# Patient Record
(demographics unavailable — no encounter records)

---

## 2025-05-20 NOTE — REASON FOR VISIT
[Behavioral Health Urgent Care Assessment] : a behavioral health urgent care assessment [Patient] : patient [Self] : alone [School] : school [Father] : with father [TextBox_17] : connection to therapy

## 2025-05-20 NOTE — RISK ASSESSMENT
[Clinical Interview] : Clinical Interview [Collateral Sources] : Collateral Sources [No] : No [No known suicide factors] : No known suicide factors [Non-compliant or not receiving treatment] : non-compliant or not receiving treatment [Triggering events leading to humiliation, shame, and/or despair] : triggering events leading to humiliation, shame, and/or despair (e.g. loss of relationship, financial or health status) (real or anticipated) [Identifies reasons for living] : identifies reasons for living [Supportive social network of family or friends] : supportive social network of family or friends [Engaged in work or school] : engaged in work or school [None in the patient's lifetime] : None in the patient's lifetime [None Known] : none known [No known risk factors] : No known risk factors [Yes] : yes [de-identified] : no access to guns

## 2025-05-20 NOTE — PLAN
[Provision of National Suicide Prevention Lifeline 6-426-670-TALK (6009)] : Provision of national suicide prevention lifeline 9-191-988-talk (6149) [Patient] : patient [Family] : family [None on Record] : none on record [TextBox_9] : therapy and new neurologist  [TextBox_11] : continue stimulant as prescribed by current neurologist for now  [TextBox_13] :  no safety concerns at this time. no guns at home. family should call 911 or go to nearest ER or any acute safety concerns. [TextBox_26] : referred by school, signed consent, emailed contact

## 2025-05-20 NOTE — ADDENDUM
[FreeTextEntry1] : Patient was seen and examined by me, Dr. Matilda Alejo. I reviewed and agreed with the findings and plan as documented in the [facility] note, unless noted below.

## 2025-05-20 NOTE — HISTORY OF PRESENT ILLNESS
[Not Applicable] : Not applicable [FreeTextEntry1] : Patient is a 13 y/o male, domiciled with parents, 5 y/o brother and 10 y/o sister, currently enrolled at [employer], 7th grade general education with IEP, PPH ADHD, currently in outpatient treatment with neurologist prescribed methylphenidate 18mg, no prior psychiatric hospitalization, no self-injury or suicide attempts, no aggression/violence, no substance use, no legal issues, no CPS involvement, no trauma/abuse, no PMH, presenting today with father who was referred by school for connection to therapy for sadness and anxiety in response to stressors.  [facility] met with the patient who presented as polite and cooperative. He says he is not sure why here is here but was willing to engage in conversation. Patient shares he had "random mental breakdowns" ~jan/feb, feels improvements have been noted in recent weeks but continues to endorse some mood fluctuations in response to stressors. He was able to identify kids being "annoying" to him about his ethnicity as a trigger. Patient reports this causes low moods as it "hurts his feelings". Patient says he is glad his medication "limits his emotions" because he feels it helps him not care as much. Writer provided psychoeducation about the importance of allowing himself to feel his feelings. Reports worrying bullying will happen again when he goes to school but denies school avoidance. Patient notes another trigger for sadness is if he says or does something he regrets. He denies persistent sadness or changes to sleep, appetite, interest, energy or motivation at this time. Denies hx NSSIB or SI. Adds he believes he is going through puberty and thinks this is also impacting mood.  He reports doing well in school, feels he has good time management and organization skills- finds the medication helpful in helping him focus and helps him not to be forgetful. Patient notices when he does not take his medication, he is fidgety, forgetful, bored easily and impulsive. Notes medication also impacts appetite but denies weight concerns. Patient reports worries about the future, "how can I stay with my parents forever", "if I make a mistake I worry something bad will happen", he worries about how he will socialize over the summer when school is over, he worries what others think of him. Denies hx PA. denies/does not present with symptoms of omari or psychosis. He presents as help seeking and motivated for treatment.   [facility] obtained collateral information from father. He describes the patient as thoughtful and kind. Dad feels the patient has presented as sad and emotional for the last 2 months. Reports patient was tearful at times but told parents he did not want to talk about it. Dad feels patient has always been shy but feels it has been more significant since middle school. Reports patient does not have a lot of friends and is quiet in school, often sits by himself. Dad feels he has some anxiety with being in the spotlight. Reports bullying started this school year. Denies persistent sad moods, or changes to sleep, appetite, interest, energy or motivation. Denies hx NSSIB or SI. Denies excessive worries. Denies behavioral issues. Denies acute safety concerns- is looking forward to patient being connected to therapy.  [FreeTextEntry2] : Patient has not had any past psychiatric visits, hospitalizations, medication trials or visits with a therapist. No hx suicidality, suicide attempts or self injurious behaviors.     [FreeTextEntry3] : n/a- has been on methylphenidate since 1st grade

## 2025-05-20 NOTE — PHYSICAL EXAM
[Cooperative] : cooperative [Euthymic] : euthymic [Anxious] : anxious [Full] : full [Clear] : clear [Linear/Goal Directed] : linear/goal directed [Average] : average [WNL] : within normal limits [Positive interaction] : positive interaction [Unremarkable/age appropriate] : unremarkable/age appropriate [Normal] : normal [None] : none

## 2025-05-20 NOTE — PLAN
[Provision of National Suicide Prevention Lifeline 6-530-205-TALK (6423)] : Provision of national suicide prevention lifeline 8-780-575-talk (6849) [Patient] : patient [Family] : family [None on Record] : none on record [TextBox_9] : therapy and new neurologist  [TextBox_11] : continue stimulant as prescribed by current neurologist for now  [TextBox_13] :  no safety concerns at this time. no guns at home. family should call 911 or go to nearest ER or any acute safety concerns. [TextBox_26] : referred by school, signed consent, emailed contact

## 2025-05-20 NOTE — RISK ASSESSMENT
[Clinical Interview] : Clinical Interview [Collateral Sources] : Collateral Sources [No] : No [No known suicide factors] : No known suicide factors [Non-compliant or not receiving treatment] : non-compliant or not receiving treatment [Triggering events leading to humiliation, shame, and/or despair] : triggering events leading to humiliation, shame, and/or despair (e.g. loss of relationship, financial or health status) (real or anticipated) [Identifies reasons for living] : identifies reasons for living [Supportive social network of family or friends] : supportive social network of family or friends [Engaged in work or school] : engaged in work or school [None in the patient's lifetime] : None in the patient's lifetime [None Known] : none known [No known risk factors] : No known risk factors [Yes] : yes [de-identified] : no access to guns

## 2025-05-20 NOTE — DISCUSSION/SUMMARY
[Low acute suicide risk] : Low acute suicide risk [No] : No [Not clinically indicated] : Safety Plan completed/updated (for individuals at risk): Not clinically indicated [FreeTextEntry1] : At present, patient has a low risk of harm to self.  Although patient has risk factors including male gender, ADHD, patient has significant protective factors including strong family/social support, domiciled, age, lack of prior self-harm, no suicide attempts, no substance use, no omari, no psychosis, no CAH, no psychiatric hospitalization, current willingness to engage in treatment, future orientation with long & short term goals for the future, hopeful, help-seeking, engaged in school & activities, current denial of any SIIP or urges to self-harm, no reported hx of abuse/trauma, no aggression/violence, no access to guns/family is able to means restrict, no legal history.

## 2025-05-20 NOTE — HISTORY OF PRESENT ILLNESS
[Not Applicable] : Not applicable [FreeTextEntry1] : Patient is a 11 y/o male, domiciled with parents, 7 y/o brother and 8 y/o sister, currently enrolled at [employer], 7th grade general education with IEP, PPH ADHD, currently in outpatient treatment with neurologist prescribed methylphenidate 18mg, no prior psychiatric hospitalization, no self-injury or suicide attempts, no aggression/violence, no substance use, no legal issues, no CPS involvement, no trauma/abuse, no PMH, presenting today with father who was referred by school for connection to therapy for sadness and anxiety in response to stressors.  [facility] met with the patient who presented as polite and cooperative. He says he is not sure why here is here but was willing to engage in conversation. Patient shares he had "random mental breakdowns" ~jan/feb, feels improvements have been noted in recent weeks but continues to endorse some mood fluctuations in response to stressors. He was able to identify kids being "annoying" to him about his ethnicity as a trigger. Patient reports this causes low moods as it "hurts his feelings". Patient says he is glad his medication "limits his emotions" because he feels it helps him not care as much. Writer provided psychoeducation about the importance of allowing himself to feel his feelings. Reports worrying bullying will happen again when he goes to school but denies school avoidance. Patient notes another trigger for sadness is if he says or does something he regrets. He denies persistent sadness or changes to sleep, appetite, interest, energy or motivation at this time. Denies hx NSSIB or SI. Adds he believes he is going through puberty and thinks this is also impacting mood.  He reports doing well in school, feels he has good time management and organization skills- finds the medication helpful in helping him focus and helps him not to be forgetful. Patient notices when he does not take his medication, he is fidgety, forgetful, bored easily and impulsive. Notes medication also impacts appetite but denies weight concerns. Patient reports worries about the future, "how can I stay with my parents forever", "if I make a mistake I worry something bad will happen", he worries about how he will socialize over the summer when school is over, he worries what others think of him. Denies hx PA. denies/does not present with symptoms of omari or psychosis. He presents as help seeking and motivated for treatment.   [facility] obtained collateral information from father. He describes the patient as thoughtful and kind. Dad feels the patient has presented as sad and emotional for the last 2 months. Reports patient was tearful at times but told parents he did not want to talk about it. Dad feels patient has always been shy but feels it has been more significant since middle school. Reports patient does not have a lot of friends and is quiet in school, often sits by himself. Dad feels he has some anxiety with being in the spotlight. Reports bullying started this school year. Denies persistent sad moods, or changes to sleep, appetite, interest, energy or motivation. Denies hx NSSIB or SI. Denies excessive worries. Denies behavioral issues. Denies acute safety concerns- is looking forward to patient being connected to therapy.  [FreeTextEntry2] : Patient has not had any past psychiatric visits, hospitalizations, medication trials or visits with a therapist. No hx suicidality, suicide attempts or self injurious behaviors.     [FreeTextEntry3] : n/a- has been on methylphenidate since 1st grade

## 2025-05-20 NOTE — PLAN
[Provision of National Suicide Prevention Lifeline 6-809-487-TALK (4790)] : Provision of national suicide prevention lifeline 4-421-981-talk (2606) [Patient] : patient [Family] : family [None on Record] : none on record [TextBox_9] : therapy and new neurologist  [TextBox_11] : continue stimulant as prescribed by current neurologist for now  [TextBox_13] :  no safety concerns at this time. no guns at home. family should call 911 or go to nearest ER or any acute safety concerns. [TextBox_26] : referred by school, signed consent, emailed contact

## 2025-05-20 NOTE — HISTORY OF PRESENT ILLNESS
[Not Applicable] : Not applicable [FreeTextEntry1] : Patient is a 11 y/o male, domiciled with parents, 5 y/o brother and 8 y/o sister, currently enrolled at [employer], 7th grade general education with IEP, PPH ADHD, currently in outpatient treatment with neurologist prescribed methylphenidate 18mg, no prior psychiatric hospitalization, no self-injury or suicide attempts, no aggression/violence, no substance use, no legal issues, no CPS involvement, no trauma/abuse, no PMH, presenting today with father who was referred by school for connection to therapy for sadness and anxiety in response to stressors.  [facility] met with the patient who presented as polite and cooperative. He says he is not sure why here is here but was willing to engage in conversation. Patient shares he had "random mental breakdowns" ~jan/feb, feels improvements have been noted in recent weeks but continues to endorse some mood fluctuations in response to stressors. He was able to identify kids being "annoying" to him about his ethnicity as a trigger. Patient reports this causes low moods as it "hurts his feelings". Patient says he is glad his medication "limits his emotions" because he feels it helps him not care as much. Writer provided psychoeducation about the importance of allowing himself to feel his feelings. Reports worrying bullying will happen again when he goes to school but denies school avoidance. Patient notes another trigger for sadness is if he says or does something he regrets. He denies persistent sadness or changes to sleep, appetite, interest, energy or motivation at this time. Denies hx NSSIB or SI. Adds he believes he is going through puberty and thinks this is also impacting mood.  He reports doing well in school, feels he has good time management and organization skills- finds the medication helpful in helping him focus and helps him not to be forgetful. Patient notices when he does not take his medication, he is fidgety, forgetful, bored easily and impulsive. Notes medication also impacts appetite but denies weight concerns. Patient reports worries about the future, "how can I stay with my parents forever", "if I make a mistake I worry something bad will happen", he worries about how he will socialize over the summer when school is over, he worries what others think of him. Denies hx PA. denies/does not present with symptoms of omari or psychosis. He presents as help seeking and motivated for treatment.   [facility] obtained collateral information from father. He describes the patient as thoughtful and kind. Dad feels the patient has presented as sad and emotional for the last 2 months. Reports patient was tearful at times but told parents he did not want to talk about it. Dad feels patient has always been shy but feels it has been more significant since middle school. Reports patient does not have a lot of friends and is quiet in school, often sits by himself. Dad feels he has some anxiety with being in the spotlight. Reports bullying started this school year. Denies persistent sad moods, or changes to sleep, appetite, interest, energy or motivation. Denies hx NSSIB or SI. Denies excessive worries. Denies behavioral issues. Denies acute safety concerns- is looking forward to patient being connected to therapy.  [FreeTextEntry2] : Patient has not had any past psychiatric visits, hospitalizations, medication trials or visits with a therapist. No hx suicidality, suicide attempts or self injurious behaviors.     [FreeTextEntry3] : n/a- has been on methylphenidate since 1st grade  No

## 2025-05-20 NOTE — RISK ASSESSMENT
[Clinical Interview] : Clinical Interview [Collateral Sources] : Collateral Sources [No] : No [No known suicide factors] : No known suicide factors [Non-compliant or not receiving treatment] : non-compliant or not receiving treatment [Triggering events leading to humiliation, shame, and/or despair] : triggering events leading to humiliation, shame, and/or despair (e.g. loss of relationship, financial or health status) (real or anticipated) [Identifies reasons for living] : identifies reasons for living [Supportive social network of family or friends] : supportive social network of family or friends [Engaged in work or school] : engaged in work or school [None in the patient's lifetime] : None in the patient's lifetime [None Known] : none known [No known risk factors] : No known risk factors [Yes] : yes [de-identified] : no access to guns